# Patient Record
Sex: FEMALE | Race: BLACK OR AFRICAN AMERICAN | NOT HISPANIC OR LATINO | Employment: OTHER | URBAN - METROPOLITAN AREA
[De-identification: names, ages, dates, MRNs, and addresses within clinical notes are randomized per-mention and may not be internally consistent; named-entity substitution may affect disease eponyms.]

---

## 2024-08-23 NOTE — PROGRESS NOTES
"Chief Complaint  Establish Care and Knee Pain (Left knee pain & swelling x 1 week)    Subjective          Dorota Jon presents to [facility]  History of Present Illness  This patient is here to establish care.  She is a former patient of Dr. Winters.    According to Dr. Winters's records, he had been refilling her medication for almost 3 years and finally forced her into an appointment for refills.    HTN: She is taking losartan 50 mg daily and HCTZ 25 mg daily..    HLD: She is taking atorvastatin 20 mg daily.    Chronic left knee pain: She has been seeing Dr. Sutton, who wanted her to be referred to pain management.    Anxiety: She is taking clonazepam. Her psychiatrist is in [place], Dr. Reyes.         Objective   Vital Signs:   /79 (BP Location: Left arm, Patient Position: Sitting, Cuff Size: Large Adult)   Pulse 90   Temp 98.7 °F (37.1 °C) (Oral)   Ht 162.6 cm (64\")   Wt 106 kg (233 lb 3.2 oz)   SpO2 98%   BMI 40.03 kg/m²     Physical Exam  Constitutional:       General: She is not in acute distress.     Appearance: Normal appearance. She is obese.   HENT:      Head: Normocephalic.   Eyes:      Pupils: Pupils are equal, round, and reactive to light.      Visual Fields: Right eye visual fields normal and left eye visual fields normal.   Neck:      Trachea: Trachea normal.   Cardiovascular:      Rate and Rhythm: Normal rate and regular rhythm.      Heart sounds: Normal heart sounds.   Pulmonary:      Effort: Pulmonary effort is normal.      Breath sounds: Normal breath sounds and air entry.   Musculoskeletal:      Right lower leg: No edema.      Left lower leg: No edema.   Skin:     General: Skin is warm and dry.   Neurological:      Mental Status: She is alert and oriented to person, place, and time.   Psychiatric:         Mood and Affect: Mood and affect normal.         Behavior: Behavior normal.         Thought Content: Thought content normal.        Result " Review :   The following data was reviewed by: SUHAIL Montes on 08/28/2024:  TSH with Reflex FT4 (01/30/2024 12:01)  CBC with automated diff (01/30/2024 12:01)  LIPID PANEL (01/30/2024 12:01)  COMPREHENSIVE METABOLIC PANEL (01/30/2024 12:01)                 Assessment and Plan    Diagnoses and all orders for this visit:    1. Encounter to establish care (Primary)    2. Primary hypertension  Assessment & Plan:  Hypertension is stable and controlled  Continue current treatment regimen.  Dietary sodium restriction.  Weight loss.  Regular aerobic exercise.  Blood pressure will be reassessed in 3 months.    Continue HCTZ 25 mg daily and losartan 50 mg daily.    Orders:  -     CBC (No Diff); Future  -     Comprehensive Metabolic Panel; Future  -     Lipid Panel; Future    3. Pure hypercholesterolemia  Assessment & Plan:   She is currently taking atorvastatin 20 mg daily.  Will check a fasting lipid panel.    Orders:  -     CBC (No Diff); Future  -     Comprehensive Metabolic Panel; Future  -     Lipid Panel; Future    4. Screening for diabetes mellitus (DM)  -     Hemoglobin A1c; Future    5. Need for hepatitis C screening test  -     Hepatitis C Antibody; Future    6. Chronic pain of left knee  Assessment & Plan:  She has chronic pain of her left knee and reports that it is swelling.  We have discussed that I do not prescribe pain medication.  She has seen orthopedic surgeon, who recommended referral to pain management.  Will refer her to pain management.  In the meantime, we will send in meloxicam to help with the inflammation.    Orders:  -     Ambulatory Referral to Pain Management  -     meloxicam (Mobic) 7.5 MG tablet; Take 1 tablet by mouth Daily.  Dispense: 30 tablet; Refill: 1    7. Colon cancer screening  -     Cologuard - Stool, Per Rectum; Future    8. Mammogram declined    9. Blood glucose elevated  Assessment & Plan:  She reports she was fasting when she had her blood work drawn in January.  Her  blood glucose was 138, and I have suspicion she is diabetic, as her father was.  Will check an A1c.  She will most likely have to be started on metformin.  We have discussed that it may help her with her weight as well.      10. Severe obesity (BMI >= 40)  Assessment & Plan:  Patient's (Body mass index is 40.03 kg/m².) indicates that they are morbidly/severely obese (BMI > 40 or > 35 with obesity - related health condition) with health conditions that include hypertension and dyslipidemias . Weight is newly identified. BMI  is above average; BMI management plan is completed. We discussed portion control and increasing exercise.       11. Generalized anxiety disorder  Assessment & Plan:  She has been seeing her psychiatrist for at least 4 years in [place].  She is currently on clonazepam.  She lost 1 child at the age of 6 due to leukemia and another 1 recently due to overdose.  She also recently lost a niece due to overdose.            Follow Up   Return in about 3 months (around 11/28/2024).  Patient was given instructions and counseling regarding her condition or for health maintenance advice. Please see specific information pulled into the AVS if appropriate.

## 2024-08-28 ENCOUNTER — LAB (OUTPATIENT)
Dept: LAB | Facility: HOSPITAL | Age: 54
End: 2024-08-28
Payer: COMMERCIAL

## 2024-08-28 ENCOUNTER — OFFICE VISIT (OUTPATIENT)
Dept: FAMILY MEDICINE CLINIC | Age: 54
End: 2024-08-28
Payer: COMMERCIAL

## 2024-08-28 ENCOUNTER — TELEPHONE (OUTPATIENT)
Dept: FAMILY MEDICINE CLINIC | Age: 54
End: 2024-08-28

## 2024-08-28 VITALS
SYSTOLIC BLOOD PRESSURE: 109 MMHG | DIASTOLIC BLOOD PRESSURE: 79 MMHG | OXYGEN SATURATION: 98 % | WEIGHT: 233.2 LBS | HEART RATE: 90 BPM | BODY MASS INDEX: 39.81 KG/M2 | TEMPERATURE: 98.7 F | HEIGHT: 64 IN

## 2024-08-28 DIAGNOSIS — E78.00 PURE HYPERCHOLESTEROLEMIA: ICD-10-CM

## 2024-08-28 DIAGNOSIS — R73.9 BLOOD GLUCOSE ELEVATED: ICD-10-CM

## 2024-08-28 DIAGNOSIS — Z13.1 SCREENING FOR DIABETES MELLITUS (DM): ICD-10-CM

## 2024-08-28 DIAGNOSIS — Z12.11 COLON CANCER SCREENING: ICD-10-CM

## 2024-08-28 DIAGNOSIS — M25.562 CHRONIC PAIN OF LEFT KNEE: ICD-10-CM

## 2024-08-28 DIAGNOSIS — G89.29 CHRONIC PAIN OF LEFT KNEE: ICD-10-CM

## 2024-08-28 DIAGNOSIS — Z11.59 NEED FOR HEPATITIS C SCREENING TEST: ICD-10-CM

## 2024-08-28 DIAGNOSIS — Z76.89 ENCOUNTER TO ESTABLISH CARE: Primary | ICD-10-CM

## 2024-08-28 DIAGNOSIS — F41.1 GENERALIZED ANXIETY DISORDER: ICD-10-CM

## 2024-08-28 DIAGNOSIS — Z53.20 MAMMOGRAM DECLINED: ICD-10-CM

## 2024-08-28 DIAGNOSIS — E66.01 SEVERE OBESITY (BMI >= 40): ICD-10-CM

## 2024-08-28 DIAGNOSIS — I10 PRIMARY HYPERTENSION: ICD-10-CM

## 2024-08-28 LAB
DEPRECATED RDW RBC AUTO: 47.5 FL (ref 37–54)
ERYTHROCYTE [DISTWIDTH] IN BLOOD BY AUTOMATED COUNT: 13 % (ref 12.3–15.4)
HCT VFR BLD AUTO: 36.2 % (ref 34–46.6)
HGB BLD-MCNC: 11.5 G/DL (ref 12–15.9)
MCH RBC QN AUTO: 31.3 PG (ref 26.6–33)
MCHC RBC AUTO-ENTMCNC: 31.8 G/DL (ref 31.5–35.7)
MCV RBC AUTO: 98.6 FL (ref 79–97)
PLATELET # BLD AUTO: 265 10*3/MM3 (ref 140–450)
PMV BLD AUTO: 9.2 FL (ref 6–12)
RBC # BLD AUTO: 3.67 10*6/MM3 (ref 3.77–5.28)
WBC NRBC COR # BLD AUTO: 9.19 10*3/MM3 (ref 3.4–10.8)

## 2024-08-28 PROCEDURE — 85027 COMPLETE CBC AUTOMATED: CPT

## 2024-08-28 PROCEDURE — 86803 HEPATITIS C AB TEST: CPT

## 2024-08-28 PROCEDURE — 83036 HEMOGLOBIN GLYCOSYLATED A1C: CPT

## 2024-08-28 PROCEDURE — 1160F RVW MEDS BY RX/DR IN RCRD: CPT | Performed by: NURSE PRACTITIONER

## 2024-08-28 PROCEDURE — 3074F SYST BP LT 130 MM HG: CPT | Performed by: NURSE PRACTITIONER

## 2024-08-28 PROCEDURE — 36415 COLL VENOUS BLD VENIPUNCTURE: CPT

## 2024-08-28 PROCEDURE — 80061 LIPID PANEL: CPT

## 2024-08-28 PROCEDURE — 1159F MED LIST DOCD IN RCRD: CPT | Performed by: NURSE PRACTITIONER

## 2024-08-28 PROCEDURE — 3078F DIAST BP <80 MM HG: CPT | Performed by: NURSE PRACTITIONER

## 2024-08-28 PROCEDURE — 80053 COMPREHEN METABOLIC PANEL: CPT

## 2024-08-28 PROCEDURE — 99204 OFFICE O/P NEW MOD 45 MIN: CPT | Performed by: NURSE PRACTITIONER

## 2024-08-28 RX ORDER — LOSARTAN POTASSIUM 50 MG/1
50 TABLET ORAL DAILY
COMMUNITY
Start: 2024-01-30

## 2024-08-28 RX ORDER — ATORVASTATIN CALCIUM 20 MG/1
20 TABLET, FILM COATED ORAL DAILY
COMMUNITY

## 2024-08-28 RX ORDER — HYDROCHLOROTHIAZIDE 25 MG/1
25 TABLET ORAL DAILY
COMMUNITY

## 2024-08-28 RX ORDER — CLONAZEPAM 1 MG/1
1 TABLET ORAL 3 TIMES DAILY
COMMUNITY
Start: 2024-08-15

## 2024-08-28 RX ORDER — MELOXICAM 7.5 MG/1
7.5 TABLET ORAL DAILY
Qty: 30 TABLET | Refills: 1 | Status: SHIPPED | OUTPATIENT
Start: 2024-08-28

## 2024-08-28 NOTE — ASSESSMENT & PLAN NOTE
She has chronic pain of her left knee and reports that it is swelling.  We have discussed that I do not prescribe pain medication.  She has seen orthopedic surgeon, who recommended referral to pain management.  Will refer her to pain management.  In the meantime, we will send in meloxicam to help with the inflammation.

## 2024-08-28 NOTE — ASSESSMENT & PLAN NOTE
Patient's (Body mass index is 40.03 kg/m².) indicates that they are morbidly/severely obese (BMI > 40 or > 35 with obesity - related health condition) with health conditions that include hypertension and dyslipidemias . Weight is newly identified. BMI  is above average; BMI management plan is completed. We discussed portion control and increasing exercise.

## 2024-08-28 NOTE — ASSESSMENT & PLAN NOTE
Hypertension is stable and controlled  Continue current treatment regimen.  Dietary sodium restriction.  Weight loss.  Regular aerobic exercise.  Blood pressure will be reassessed in 3 months.    Continue HCTZ 25 mg daily and losartan 50 mg daily.

## 2024-08-28 NOTE — ASSESSMENT & PLAN NOTE
She reports she was fasting when she had her blood work drawn in January.  Her blood glucose was 138, and I have suspicion she is diabetic, as her father was.  Will check an A1c.  She will most likely have to be started on metformin.  We have discussed that it may help her with her weight as well.

## 2024-08-28 NOTE — ASSESSMENT & PLAN NOTE
She has been seeing her psychiatrist for at least 4 years in [place].  She is currently on clonazepam.  She lost 1 child at the age of 6 due to leukemia and another 1 recently due to overdose.  She also recently lost a niece due to overdose.

## 2024-08-29 LAB
ALBUMIN SERPL-MCNC: 4.3 G/DL (ref 3.5–5.2)
ALBUMIN/GLOB SERPL: 1.1 G/DL
ALP SERPL-CCNC: 89 U/L (ref 39–117)
ALT SERPL W P-5'-P-CCNC: 40 U/L (ref 1–33)
ANION GAP SERPL CALCULATED.3IONS-SCNC: 11.2 MMOL/L (ref 5–15)
AST SERPL-CCNC: 30 U/L (ref 1–32)
BILIRUB SERPL-MCNC: 0.4 MG/DL (ref 0–1.2)
BUN SERPL-MCNC: 11 MG/DL (ref 6–20)
BUN/CREAT SERPL: 12 (ref 7–25)
CALCIUM SPEC-SCNC: 9.8 MG/DL (ref 8.6–10.5)
CHLORIDE SERPL-SCNC: 102 MMOL/L (ref 98–107)
CHOLEST SERPL-MCNC: 179 MG/DL (ref 0–200)
CO2 SERPL-SCNC: 26.8 MMOL/L (ref 22–29)
CREAT SERPL-MCNC: 0.92 MG/DL (ref 0.57–1)
EGFRCR SERPLBLD CKD-EPI 2021: 73.7 ML/MIN/1.73
GLOBULIN UR ELPH-MCNC: 3.8 GM/DL
GLUCOSE SERPL-MCNC: 90 MG/DL (ref 65–99)
HBA1C MFR BLD: 5.1 % (ref 4.8–5.6)
HCV AB SER QL: NORMAL
HDLC SERPL-MCNC: 67 MG/DL (ref 40–60)
LDLC SERPL CALC-MCNC: 88 MG/DL (ref 0–100)
LDLC/HDLC SERPL: 1.25 {RATIO}
POTASSIUM SERPL-SCNC: 3.5 MMOL/L (ref 3.5–5.2)
PROT SERPL-MCNC: 8.1 G/DL (ref 6–8.5)
SODIUM SERPL-SCNC: 140 MMOL/L (ref 136–145)
TRIGL SERPL-MCNC: 141 MG/DL (ref 0–150)
VLDLC SERPL-MCNC: 24 MG/DL (ref 5–40)

## 2024-08-30 ENCOUNTER — PATIENT ROUNDING (BHMG ONLY) (OUTPATIENT)
Dept: FAMILY MEDICINE CLINIC | Age: 54
End: 2024-08-30
Payer: COMMERCIAL

## 2024-08-30 NOTE — PROGRESS NOTES
August 30, 2024    Hello, may I speak with Dorota RHODES [place]?    My name is Berta      I am  with [facility]  [address] [facility] [address]  [place] [address] [phone].    Before we get started may I verify your date of birth? 1970  I am calling to officially welcome you to our practice and ask about your recent visit. Is this a good time to talk?  YES  Tell me about your visit with us. What things went well?  Visit was fine, went well.       We're always looking for ways to make our patients' experiences even better. Do you have recommendations on ways we may improve?  NO    Overall were you satisfied with your first visit to our practice? YES       I appreciate you taking the time to speak with me today. Is there anything else I can do for you? NO      Thank you, and have a great day.

## 2024-09-03 ENCOUNTER — TELEPHONE (OUTPATIENT)
Dept: FAMILY MEDICINE CLINIC | Age: 54
End: 2024-09-03
Payer: COMMERCIAL

## 2024-09-03 NOTE — TELEPHONE ENCOUNTER
Caller: Dorota Jon    Relationship: Self    Best call back number: [phone]    What medication are you requesting: WEIGHT LOSS MEDICATIONS     What are your current symptoms: RAPID WEIGHT GAIN     How long have you been experiencing symptoms: N/A     Have you had these symptoms before:    [] Yes  [x] No    Have you been treated for these symptoms before:   [] Yes  [x] No    If a prescription is needed, what is your preferred pharmacy and phone number: [facility] [address] [phone] [facility] [phone]

## 2024-09-03 NOTE — TELEPHONE ENCOUNTER
Caller: Dorota Jon    Relationship: Self    Best call back number: [phone]     Who is your current provider: LEONIDAS RYAN    Is your current provider offboarding? NO     Who would you like your new provider to be: VELIA MARTINEZ     What are your reasons for transferring care: PATIENT STATES THEY WANT A MD AS THEIR PCP.

## 2024-09-03 NOTE — TELEPHONE ENCOUNTER
Caller: Danay Dorota MEAGAN    Relationship: Self    Best call back number: [phone]     What is the best time to reach you: ANYTIME    Who are you requesting to speak with (clinical staff, provider,  specific staff member): CLINICAL    What was the call regarding: PATIENT WOULD LIKE A CALLBACK TO DISCUSS RESULTS FROM LABS LAST WEEK AND IF ANY MEDICATION WILL NEED TO BE STARTED.    Is it okay if the provider responds through [facility]: NO, CALL PREFERRED CONTINUE TO CALL IF NO ANSWER.

## 2024-09-11 NOTE — TELEPHONE ENCOUNTER
NEW PT, FILLED OUT MEDICAL RECORDS RELEASE FORMS. HAS BEEN SCANNED AND SENT TO [facility].    MRB 8/28/24   2

## 2024-12-30 ENCOUNTER — TELEPHONE (OUTPATIENT)
Dept: FAMILY MEDICINE CLINIC | Age: 54
End: 2024-12-30
Payer: COMMERCIAL

## 2024-12-30 NOTE — TELEPHONE ENCOUNTER
Called and spoke with pt regarding no show, informed them of no show policy, they did reschedule appt

## 2025-02-11 ENCOUNTER — TELEPHONE (OUTPATIENT)
Dept: FAMILY MEDICINE CLINIC | Age: 55
End: 2025-02-11
Payer: COMMERCIAL

## 2025-02-11 NOTE — TELEPHONE ENCOUNTER
Pt was called due to the appt that was no showed on 02/06/2024. This is the second documented no show of this calendar year. Pt stated that she would call back to get this appt rescheduled. A letter will be sent to inform her of the no show policy.